# Patient Record
Sex: FEMALE | Race: WHITE
[De-identification: names, ages, dates, MRNs, and addresses within clinical notes are randomized per-mention and may not be internally consistent; named-entity substitution may affect disease eponyms.]

---

## 2017-01-05 ENCOUNTER — HOSPITAL ENCOUNTER (OUTPATIENT)
Dept: HOSPITAL 45 - C.LAB1850 | Age: 47
Discharge: HOME | End: 2017-01-05
Attending: FAMILY MEDICINE
Payer: COMMERCIAL

## 2017-01-05 DIAGNOSIS — R53.83: Primary | ICD-10-CM

## 2017-01-05 LAB
ALBUMIN/GLOB SERPL: 0.9 {RATIO} (ref 0.9–2)
ALP SERPL-CCNC: 78 U/L (ref 45–117)
ALT SERPL-CCNC: 37 U/L (ref 12–78)
ANION GAP SERPL CALC-SCNC: 6 MMOL/L (ref 3–11)
AST SERPL-CCNC: 22 U/L (ref 15–37)
BASOPHILS # BLD: 0.02 K/UL (ref 0–0.2)
BASOPHILS NFR BLD: 0.2 %
BUN SERPL-MCNC: 7 MG/DL (ref 7–18)
BUN/CREAT SERPL: 9.5 (ref 10–20)
CALCIUM SERPL-MCNC: 8.9 MG/DL (ref 8.5–10.1)
CHLORIDE SERPL-SCNC: 104 MMOL/L (ref 98–107)
CO2 SERPL-SCNC: 32 MMOL/L (ref 21–32)
COMPLETE: YES
CREAT SERPL-MCNC: 0.71 MG/DL (ref 0.6–1.2)
EOSINOPHIL NFR BLD AUTO: 203 K/UL (ref 130–400)
GLOBULIN SER-MCNC: 3.9 GM/DL (ref 2.5–4)
GLUCOSE SERPL-MCNC: 86 MG/DL (ref 70–99)
HCT VFR BLD CALC: 40.6 % (ref 37–47)
IG%: 0.1 %
IMM GRANULOCYTES NFR BLD AUTO: 14.6 %
LYME DISEASE AB IGG: (no result)
LYME DISEASE AB IGM: (no result)
LYMPHOCYTES # BLD: 1.17 K/UL (ref 1.2–3.4)
MCH RBC QN AUTO: 31.8 PG (ref 25–34)
MCHC RBC AUTO-ENTMCNC: 33.3 G/DL (ref 32–36)
MCV RBC AUTO: 95.8 FL (ref 80–100)
MONOCYTES NFR BLD: 11.3 %
NEUTROPHILS # BLD AUTO: 2.6 %
NEUTROPHILS NFR BLD AUTO: 71.2 %
PMV BLD AUTO: 10.7 FL (ref 7.4–10.4)
POTASSIUM SERPL-SCNC: 3.7 MMOL/L (ref 3.5–5.1)
RBC # BLD AUTO: 4.24 M/UL (ref 4.2–5.4)
SODIUM SERPL-SCNC: 142 MMOL/L (ref 136–145)
TSH SERPL-ACNC: 0.3 UIU/ML (ref 0.3–4.5)
WBC # BLD AUTO: 8.03 K/UL (ref 4.8–10.8)

## 2017-01-17 ENCOUNTER — HOSPITAL ENCOUNTER (OUTPATIENT)
Dept: HOSPITAL 45 - C.NEUR | Age: 47
Discharge: HOME | End: 2017-01-17
Attending: FAMILY MEDICINE
Payer: COMMERCIAL

## 2017-01-17 DIAGNOSIS — G47.19: Primary | ICD-10-CM

## 2017-01-17 DIAGNOSIS — R06.83: ICD-10-CM

## 2017-01-20 NOTE — POLYSOMNOGRAPH REPORT
CLINICAL DATA:  46-year-old with BMI female with BMI of 26.47 referred by Dr. Archer with history of fatigue and daytime hypersomnolence for a sleep

study.  On the evening of 01/17/2017, a home sleep apnea test was performed

using a [employer] type 3 monitor.

 

RECORDING RESULTS:  Total recording time was 10 hours.  The patient's

estimated sleep time and patient monitoring time was 6.9 hours.

 

RESPIRATORY DATA:  There was no evidence of sleep apnea.  No respiratory

events were recorded.  The DANELLE was 0.

 

OXIMETRY DATA:  No hypoxemia was seen.  Oxygen tian was 91%.  Mean

saturation was 94%.

 

EKG:  Heart rates ranged from 66-86 beats per minute.

 

SNORING DATA:  Snoring was recorded intermittently throughout the night.

 

IMPRESSION:  No evidence of clinically significant sleep apnea/hypopnea or

nocturnal hypoxemia with an DANELLE of 0 and no evidence of oxygen desaturation.

The patient should continue to practice good sleep hygiene.

 

 

 

RILEY

## 2017-01-26 ENCOUNTER — HOSPITAL ENCOUNTER (OUTPATIENT)
Dept: HOSPITAL 45 - C.MAMM | Age: 47
Discharge: HOME | End: 2017-01-26
Attending: OBSTETRICS & GYNECOLOGY
Payer: COMMERCIAL

## 2017-01-26 DIAGNOSIS — N63: Primary | ICD-10-CM

## 2017-01-26 NOTE — MAMMOGRAPHY REPORT
UNILATERAL LEFT DIGITAL DIAGNOSTIC MAMMOGRAM TOMOSYNTHESIS AND TARGETED LEFT ULTRASOUND: 1/26/2017

CLINICAL HISTORY: Callback from screening mammogram for possible left breast mass.  





TECHNIQUE:  Breast tomosynthesis in addition to standard 2D mammography was performed.  Spot jamar
michoacano left CC and MLO 2-D and tomosynthesis images were obtained.



COMPARISON: Comparison is made to exams dated:  12/21/2016 mammogram - [facility],
 12/18/2015 mammogram, 12/16/2014 mammogram, 12/5/2013 mammogram, 9/17/2012 mammogram, and 9/2/2011 
mammogram.   



BREAST COMPOSITION:  There are scattered areas of fibroglandular density in the left breast.  



FINDINGS:  The previously described possible mass seen within the left breast, best seen on the MLO 
view posterior to the nipple, does not clearly persist on the additional views, without a suspicious
 mass, architectural distortion, or other suspicious finding noted in this region on the additional 
compression views.



Targeted ultrasound was performed of the left lower outer quadrant and 3:00, 9:00, and subareolar br
east.  No suspicious masses or other suspicious sonographic abnormalities are evident.  In the left 
breast at 9:00 periareolar region, there is an oval anechoic 8 x 2 x 4 mm mass, which either represe
nts a benign cyst versus focal duct ectasia and is benign.  This is felt to be incidentally noted an
d likely does not correspond with the initial screening mammographic finding.





IMPRESSION:  ACR BI-RADS CATEGORY 2: BENIGN, TARGETED ULTRASOUND ACR BI-RADS CATEGORY 2: BENIGN 

The previously described possible mass in the left breast does not persist on the additional views, 
without a corresponding suspicious sonographic abnormality evident.  Findings are benign and compati
ble with normal fibroglandular tissue.  There is no mammographic or targeted sonographic evidence of
 malignancy. A 1 year screening mammogram is recommended.  The patient has been verbally notified of
 the results.  





Approximately 10% of breast cancers are not detected with mammography. A negative mammographic repor
t should not delay biopsy if a clinically suggestive mass is present.



Sydnie Addison M.D.          

/:1/26/2017 09:11:48  



Imaging Technologist: Nancy Souza, [facility]

letter sent: Normal 1/2  

BI-RADS Code: ACR BI-RADS Category 2: Benign  Ultrasound BI-RADS: ACR BI-RADS Category 2: Benign

## 2017-06-02 ENCOUNTER — HOSPITAL ENCOUNTER (OUTPATIENT)
Dept: HOSPITAL 45 - C.LABPBG | Age: 47
Discharge: HOME | End: 2017-06-02
Attending: NURSE PRACTITIONER
Payer: COMMERCIAL

## 2017-06-02 DIAGNOSIS — G43.909: Primary | ICD-10-CM

## 2017-06-02 LAB
CHOLEST/HDLC SERPL: 2.1 {RATIO}
GLUCOSE UR QL: 84 MG/DL
KETONES UR QL STRIP: 86 MG/DL
NITRITE UR QL STRIP: 34 MG/DL (ref 0–150)
PH UR: 177 MG/DL (ref 0–200)
VERY LOW DENSITY LIPOPROT CALC: 7 MG/DL

## 2017-10-11 ENCOUNTER — HOSPITAL ENCOUNTER (OUTPATIENT)
Dept: HOSPITAL 45 - C.RAD1850 | Age: 47
Discharge: HOME | End: 2017-10-11
Attending: PHYSICIAN ASSISTANT
Payer: COMMERCIAL

## 2017-10-11 DIAGNOSIS — M25.551: Primary | ICD-10-CM

## 2017-10-11 NOTE — DIAGNOSTIC IMAGING REPORT
SINGLE VIEW PELVIS; 2 VIEWS RIGHT HIP



CLINICAL HISTORY: Right hip pain.



FINDINGS: AP view of the pelvis with AP and frog-leg views of the right hip are

obtained. No prior studies are available for comparison at the time of

dictation. The skeletal structures are well mineralized. No fracture is seen in

the hips or bony pelvis. The joint spaces of the hips are preserved. The

sacroiliac joints are within normal limits. Small phleboliths are seen in the

pelvis. There is a nonobstructed abdominal bowel gas pattern.



IMPRESSION: No acute bony abnormality is seen in the hips or pelvis.







Electronically signed by:  Amilcar Gleason M.D.

10/11/2017 3:03 PM



Dictated Date/Time:  10/11/2017 2:57 PM

## 2017-12-18 ENCOUNTER — HOSPITAL ENCOUNTER (OUTPATIENT)
Dept: HOSPITAL 45 - C.PAPS | Age: 47
Discharge: HOME | End: 2017-12-18
Attending: OBSTETRICS & GYNECOLOGY
Payer: COMMERCIAL

## 2017-12-18 DIAGNOSIS — Z12.4: Primary | ICD-10-CM

## 2017-12-26 ENCOUNTER — HOSPITAL ENCOUNTER (OUTPATIENT)
Dept: HOSPITAL 45 - C.MAMM | Age: 47
Discharge: HOME | End: 2017-12-26
Attending: OBSTETRICS & GYNECOLOGY
Payer: COMMERCIAL

## 2017-12-26 DIAGNOSIS — Z12.31: Primary | ICD-10-CM

## 2017-12-26 NOTE — MAMMOGRAPHY REPORT
BILATERAL DIGITAL SCREENING MAMMOGRAM TOMOSYNTHESIS WITH CAD: 12/26/2017

CLINICAL HISTORY: Routine screening.  Patient has no complaints.  





TECHNIQUE:  Breast tomosynthesis in addition to standard 2D mammography was performed. Current study 
was also evaluated with a Computer Aided Detection (CAD) system.  



COMPARISON: Comparison is made to exams dated:  1/26/2017 ultrasound, 1/26/2017 mammogram, 12/21/2016
 mammogram - [facility], 12/18/2015 mammogram, 12/16/2014 mammogram, and 12/5/2013 
mammogram.   



BREAST COMPOSITION:  There are scattered areas of fibroglandular density in both breasts.  



FINDINGS:  The parenchymal pattern is unchanged. No developing mass, architectural distortion or clus
ter of suspicious microcalcifications is seen in either breast.  





IMPRESSION:  ACR BI-RADS CATEGORY 2: BENIGN

There is no mammographic evidence of malignancy. A 1 year screening mammogram is recommended.  The pa
tient will receive written notification of the results.  





Approximately 10% of breast cancers are not detected with mammography. A negative mammographic report
 should not delay biopsy if a clinically suggestive mass is present.



Magui Friedman M.D.          

ay/:12/26/2017 08:23:48  



Imaging Technologist: Esther RUIZ(RADHA)(M), [facility]

letter sent: Normal 1/2  

BI-RADS Code: ACR BI-RADS Category 2: Benign

## 2018-02-12 ENCOUNTER — HOSPITAL ENCOUNTER (OUTPATIENT)
Dept: HOSPITAL 45 - C.LABSPEC | Age: 48
Discharge: HOME | End: 2018-02-12
Attending: PHYSICIAN ASSISTANT
Payer: COMMERCIAL

## 2018-02-12 DIAGNOSIS — J35.1: Primary | ICD-10-CM

## 2018-05-03 ENCOUNTER — HOSPITAL ENCOUNTER (OUTPATIENT)
Dept: HOSPITAL 45 - C.LABSPEC | Age: 48
Discharge: HOME | End: 2018-05-03
Attending: FAMILY MEDICINE
Payer: COMMERCIAL

## 2018-05-03 DIAGNOSIS — J02.9: Primary | ICD-10-CM
